# Patient Record
Sex: FEMALE | Race: WHITE | NOT HISPANIC OR LATINO | URBAN - METROPOLITAN AREA
[De-identification: names, ages, dates, MRNs, and addresses within clinical notes are randomized per-mention and may not be internally consistent; named-entity substitution may affect disease eponyms.]

---

## 2019-05-29 ENCOUNTER — WALK IN (OUTPATIENT)
Dept: URGENT CARE | Age: 24
End: 2019-05-29

## 2019-05-29 VITALS
RESPIRATION RATE: 24 BRPM | TEMPERATURE: 99 F | BODY MASS INDEX: 18.52 KG/M2 | OXYGEN SATURATION: 99 % | DIASTOLIC BLOOD PRESSURE: 60 MMHG | HEIGHT: 61 IN | SYSTOLIC BLOOD PRESSURE: 90 MMHG | HEART RATE: 84 BPM | WEIGHT: 98.11 LBS

## 2019-05-29 DIAGNOSIS — Z20.828 MONO EXPOSURE: Primary | ICD-10-CM

## 2019-05-29 LAB — HETEROPH AB SER QL: NEGATIVE

## 2019-05-29 PROCEDURE — 86308 HETEROPHILE ANTIBODY SCREEN: CPT | Performed by: NURSE PRACTITIONER

## 2019-05-29 PROCEDURE — 99203 OFFICE O/P NEW LOW 30 MIN: CPT | Performed by: NURSE PRACTITIONER

## 2019-05-29 ASSESSMENT — ENCOUNTER SYMPTOMS
NAUSEA: 0
CHANGE IN BOWEL HABIT: 0
SINUS PRESSURE: 1
FATIGUE: 1
FEVER: 0
ABDOMINAL PAIN: 1
SORE THROAT: 1
VOMITING: 0
CHILLS: 1
HEADACHES: 1
CONSTIPATION: 0
SINUS PAIN: 1
EYE REDNESS: 1
EYE PAIN: 0
ANOREXIA: 0
BLOOD IN STOOL: 0
DIARRHEA: 0
EYE DISCHARGE: 0
WEAKNESS: 0
COUGH: 0
EYE ITCHING: 0
SWOLLEN GLANDS: 1

## 2019-05-31 ENCOUNTER — LAB SERVICES (OUTPATIENT)
Dept: LAB | Age: 24
End: 2019-05-31

## 2019-05-31 DIAGNOSIS — Z20.828 MONO EXPOSURE: Primary | ICD-10-CM

## 2019-05-31 DIAGNOSIS — Z20.828 MONO EXPOSURE: ICD-10-CM

## 2019-05-31 PROCEDURE — 86665 EPSTEIN-BARR CAPSID VCA: CPT | Performed by: PATHOLOGY

## 2019-05-31 PROCEDURE — 36415 COLL VENOUS BLD VENIPUNCTURE: CPT | Performed by: PATHOLOGY

## 2019-06-03 ENCOUNTER — TELEPHONE (OUTPATIENT)
Dept: URGENT CARE | Age: 24
End: 2019-06-03

## 2019-06-03 LAB
EBV VCA IGM SER QL IA: 0.4 AI (ref 0–0.8)
FAX RESULTS: NORMAL

## 2020-11-30 ENCOUNTER — TELEPHONE (OUTPATIENT)
Dept: FAMILY MEDICINE CLINIC | Facility: CLINIC | Age: 25
End: 2020-11-30

## 2020-11-30 NOTE — TELEPHONE ENCOUNTER
LM for pt to call our office to reschedule her apptment with Dr. Gregory Bermudez scheduled for Monday, December 7 @ 5:45. Dr. Gregory Bermudez will not be in the office so please call our office @ [phone] to reschedule.

## 2020-12-10 ENCOUNTER — OFFICE VISIT (OUTPATIENT)
Dept: FAMILY MEDICINE CLINIC | Facility: CLINIC | Age: 25
End: 2020-12-10
Payer: COMMERCIAL

## 2020-12-10 VITALS
HEART RATE: 101 BPM | BODY MASS INDEX: 20.84 KG/M2 | RESPIRATION RATE: 15 BRPM | WEIGHT: 110.38 LBS | DIASTOLIC BLOOD PRESSURE: 56 MMHG | TEMPERATURE: 98 F | HEIGHT: 61 IN | SYSTOLIC BLOOD PRESSURE: 102 MMHG

## 2020-12-10 DIAGNOSIS — Z11.3 ROUTINE SCREENING FOR STI (SEXUALLY TRANSMITTED INFECTION): ICD-10-CM

## 2020-12-10 DIAGNOSIS — Z00.00 LABORATORY EXAMINATION ORDERED AS PART OF A ROUTINE GENERAL MEDICAL EXAMINATION: ICD-10-CM

## 2020-12-10 DIAGNOSIS — Z00.00 ANNUAL PHYSICAL EXAM: Primary | ICD-10-CM

## 2020-12-10 PROBLEM — F50.9 EATING DISORDER IN REMISSION: Status: ACTIVE | Noted: 2020-12-10

## 2020-12-10 PROCEDURE — 90686 IIV4 VACC NO PRSV 0.5 ML IM: CPT | Performed by: FAMILY MEDICINE

## 2020-12-10 PROCEDURE — 90471 IMMUNIZATION ADMIN: CPT | Performed by: FAMILY MEDICINE

## 2020-12-10 PROCEDURE — 3074F SYST BP LT 130 MM HG: CPT | Performed by: FAMILY MEDICINE

## 2020-12-10 PROCEDURE — 99385 PREV VISIT NEW AGE 18-39: CPT | Performed by: FAMILY MEDICINE

## 2020-12-10 PROCEDURE — 3008F BODY MASS INDEX DOCD: CPT | Performed by: FAMILY MEDICINE

## 2020-12-10 PROCEDURE — 3078F DIAST BP <80 MM HG: CPT | Performed by: FAMILY MEDICINE

## 2020-12-10 RX ORDER — ALPRAZOLAM 0.5 MG/1
1 TABLET ORAL AS NEEDED
COMMUNITY
Start: 2020-12-04

## 2020-12-10 RX ORDER — DOXYCYCLINE HYCLATE 20 MG
1 TABLET ORAL 2 TIMES DAILY
COMMUNITY
Start: 2020-12-03

## 2020-12-10 RX ORDER — FLUOXETINE HYDROCHLORIDE 20 MG/1
1 CAPSULE ORAL DAILY
COMMUNITY
Start: 2020-10-09

## 2020-12-10 RX ORDER — OLANZAPINE 5 MG/1
1 TABLET ORAL
COMMUNITY
Start: 2020-10-09

## 2020-12-10 RX ORDER — CLONAZEPAM 0.5 MG/1
0.5 TABLET ORAL 2 TIMES DAILY
COMMUNITY
Start: 2020-12-04

## 2020-12-10 RX ORDER — DEXTROAMPHETAMINE SACCHARATE, AMPHETAMINE ASPARTATE, DEXTROAMPHETAMINE SULFATE AND AMPHETAMINE SULFATE 5; 5; 5; 5 MG/1; MG/1; MG/1; MG/1
1 TABLET ORAL DAILY
COMMUNITY
Start: 2020-12-04

## 2020-12-10 NOTE — PROGRESS NOTES
Blayne Teague is a 23year old female who presents for a complete physical exam.   HPI:   Patient presents with:  Physical: WWE.  No gyne  Establish Care: New pt        Her last annual assessment has been over 1 year: Annual Physical due on 01/24/2000 by mouth daily. • Doxycycline Hyclate 20 MG Oral Tab Take 1 tablet by mouth 2 (two) times a day. Past Medical History:   Diagnosis Date   • Open fracture of one or more phalanges of foot 6/20/2013    Log Date: 09/29/2011       History reviewed. dysuria and difficulty urinating. Musculoskeletal: Negative for joint swelling. Skin: Negative. Negative for rash. Neurological: Negative. Negative for dizziness.         EXAM:   /56   Pulse 101   Temp 98 °F (36.7 °C) (Temporal)   Resp 15   Ht time. She has normal strength and normal reflexes. No cranial nerve deficit or sensory deficit. Skin: Skin is warm, dry and intact. No rash noted. She is not diaphoretic. No cyanosis or erythema. Nails show no clubbing.    Psychiatric: She has a normal mo

## 2020-12-29 ENCOUNTER — OFFICE VISIT (OUTPATIENT)
Dept: FAMILY MEDICINE CLINIC | Facility: CLINIC | Age: 25
End: 2020-12-29
Payer: COMMERCIAL

## 2020-12-29 VITALS
WEIGHT: 111.38 LBS | RESPIRATION RATE: 16 BRPM | DIASTOLIC BLOOD PRESSURE: 70 MMHG | HEART RATE: 98 BPM | SYSTOLIC BLOOD PRESSURE: 110 MMHG | BODY MASS INDEX: 21 KG/M2 | TEMPERATURE: 98 F

## 2020-12-29 DIAGNOSIS — Z71.89 BREAST SELF EXAMINATION EDUCATION, ENCOUNTER FOR: ICD-10-CM

## 2020-12-29 DIAGNOSIS — Z12.4 SCREENING FOR CERVICAL CANCER: Primary | ICD-10-CM

## 2020-12-29 PROCEDURE — 88175 CYTOPATH C/V AUTO FLUID REDO: CPT | Performed by: PHYSICIAN ASSISTANT

## 2020-12-29 PROCEDURE — 99213 OFFICE O/P EST LOW 20 MIN: CPT | Performed by: PHYSICIAN ASSISTANT

## 2020-12-29 PROCEDURE — 3078F DIAST BP <80 MM HG: CPT | Performed by: PHYSICIAN ASSISTANT

## 2020-12-29 PROCEDURE — 87624 HPV HI-RISK TYP POOLED RSLT: CPT | Performed by: PHYSICIAN ASSISTANT

## 2020-12-29 PROCEDURE — 87625 HPV TYPES 16 & 18 ONLY: CPT | Performed by: PHYSICIAN ASSISTANT

## 2020-12-29 PROCEDURE — 3074F SYST BP LT 130 MM HG: CPT | Performed by: PHYSICIAN ASSISTANT

## 2021-01-01 NOTE — PROGRESS NOTES
Patient presents with:  Pap: pt is here for her pap        HISTORY OF PRESENT ILLNESS  Frandy Sutherland is a 23year old female who presents for pap smear and breast exam. She recently had a physical exam. Due for her first pap smear today.  No concerns fo diagnosis)  Plan: Pap smear obtained today. If normal, repeat in 3 yrs. Will let her know results once we see these. Education breast self exams  Discussed once monthly monitoring. Discussed how to do this. Will plan for annual clinical exams.       Cynthia

## 2021-01-05 LAB — HPV I/H RISK 1 DNA SPEC QL NAA+PROBE: POSITIVE

## 2021-01-06 LAB
HPV16 DNA CVX QL PROBE+SIG AMP: NEGATIVE
HPV18 DNA CVX QL PROBE+SIG AMP: NEGATIVE

## 2021-03-17 PROBLEM — R87.610 ATYPICAL SQUAMOUS CELL CHANGES OF UNDETERMINED SIGNIFICANCE (ASCUS) ON CERVICAL CYTOLOGY WITH NEGATIVE HIGH RISK HUMAN PAPILLOMA VIRUS (HPV) TEST RESULT: Status: ACTIVE | Noted: 2021-03-17

## 2022-07-26 ENCOUNTER — TELEPHONE (OUTPATIENT)
Dept: FAMILY MEDICINE CLINIC | Facility: CLINIC | Age: 27
End: 2022-07-26

## 2022-07-26 DIAGNOSIS — Z00.00 LABORATORY EXAMINATION ORDERED AS PART OF A ROUTINE GENERAL MEDICAL EXAMINATION: Primary | ICD-10-CM

## 2022-07-26 RX ORDER — FLUOXETINE 10 MG/1
CAPSULE ORAL
COMMUNITY
Start: 2022-05-20

## 2022-07-26 RX ORDER — ZOLPIDEM TARTRATE 10 MG/1
10 TABLET ORAL NIGHTLY
COMMUNITY
Start: 2022-05-04

## 2022-07-26 NOTE — TELEPHONE ENCOUNTER
Please enter lab orders for the patient's upcoming physical appointment. Physical scheduled: Your appointments     Date & Time Appointment Department [facility])    Sep 12, 2022  1:30 PM CDT Adult Physical with MD Perla Dotson 28, [address], [place]  ([address])    PLEASE NOTE - Most insurances allow a Complete Physical once every 366 days. Please schedule accordingly. Please arrive 15 minutes prior to your scheduled appointment. Please also bring your Insurance card, Photo ID, and your medication bottles or a list of your current medication. If you no longer require this appointment, please contact your physician office to cancel. [place] [id number] [phone]         Preferred lab: [facility] ([facility]3]A LAB [facility])     The patient has been notified to complete fasting labs prior to their physical appointment.

## 2022-07-26 NOTE — TELEPHONE ENCOUNTER
1. Laboratory examination ordered as part of a routine general medical examination (Primary)  -     Comp Metabolic Panel (14); Future; Expected date: 07/26/2022  -     Lipid Panel; Future; Expected date: 07/26/2022  -     CBC With Differential With Platelet; Future; Expected date: 07/26/2022  -     TSH W Reflex To Free T4; Future; Expected date: 07/26/2022       OK to notify.  Thanks, Judie Dewitt MD